# Patient Record
(demographics unavailable — no encounter records)

---

## 2024-10-23 NOTE — PHYSICAL EXAM
[Chaperone Present] : A chaperone was present in the examining room during all aspects of the physical examination [45112] : A chaperone was present during the pelvic exam. [FreeTextEntry2] : Hanh [Normal] : Anus and perineum: Normal sphincter tone, no masses, no prolapse. [Fully active, able to carry on all pre-disease performance without restriction] : Status 0 - Fully active, able to carry on all pre-disease performance without restriction

## 2024-10-23 NOTE — OB HISTORY
[Total Preg ___] : : [unfilled] [Full Term ___] : [unfilled] (full-term) [Abortions ___] : [unfilled] (abortions) [Living ___] : [unfilled] (living) [Vaginal ___] : [unfilled] vaginal delivery(s) [ ___] : [unfilled]  section delivery(s) [AB Spont ___] : [unfilled] miscarriage(s) [Definite ___ (Date)] : the last menstrual period was [unfilled]

## 2024-10-23 NOTE — DISCUSSION/SUMMARY
[Reviewed Clinical Lab Test(s)] : Results of clinical tests were reviewed. [Reviewed Radiology Report(s)] : Radiology reports were reviewed. [Reviewed Radiology Film/Image(s)] : Images from radiology studies were reviewed and examined. [Discuss Alternatives/Risks/Benefits w/Patient] : All alternatives, risks, and benefits were discussed with the patient/family and all questions were answered.  Patient expressed good understanding and appreciates the importance of follow up as recommended. [FreeTextEntry1] : I sat down with Holly and her  and reviewed the available data.  She was initially referred for evaluation of a small ovarian cyst.  Most recent imaging with pelvic US on 10/22/24 is normal without evidence of uterine or adnexal pathology.   We discussed the results of her blood tests which showed a mildly elevated HE4 and YESIKA.  I explained that the Risk of Malignancy Algorithm (YESIKA) is a biomarker panel approved by the [place] Food and Drug Administration (FDA) to assess the likelihood of malignancy in patients in whom surgery for an adnexal mass is planned. There is no data which supports its use as a tool to primarily screen for adnexal malignancy.   There is also no data to support the use of YESIKA in the initial evaluation of an adnexal mass. I do not  recommend the use of YESIKA or HE4 alone to decide whether to proceed with surgical exploration for an adnexal mass.   Given the recent normal sonogram, I see no indication for surgery.   We discussed the results of her 9/2024 genetic testing using the [employer] panel from [facility] This shows no deleterious mutations. 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 We discussed NCCN guidelines showing no evidence of an increased risk of gynecologic cancer in women with deleterious CHEK2 mutations. I explained the difference between deleterious mutations and VUS alterations.   All questions answered. Will return to Dr. Marshall for further gynecologic care.

## 2024-10-23 NOTE — ASSESSMENT
[FreeTextEntry1] : Likely hemorrhagic/follicular cyst of ovaries. Resolved.  Normal pelvic US on 10/22/24.